# Patient Record
Sex: MALE | Race: BLACK OR AFRICAN AMERICAN | NOT HISPANIC OR LATINO | Employment: UNEMPLOYED | URBAN - METROPOLITAN AREA
[De-identification: names, ages, dates, MRNs, and addresses within clinical notes are randomized per-mention and may not be internally consistent; named-entity substitution may affect disease eponyms.]

---

## 2023-08-18 ENCOUNTER — OFFICE VISIT (OUTPATIENT)
Dept: PRIMARY CARE | Facility: CLINIC | Age: 33
End: 2023-08-18
Payer: MEDICARE

## 2023-08-18 VITALS
BODY MASS INDEX: 29.49 KG/M2 | OXYGEN SATURATION: 96 % | SYSTOLIC BLOOD PRESSURE: 126 MMHG | HEIGHT: 67 IN | TEMPERATURE: 97.3 F | WEIGHT: 187.9 LBS | HEART RATE: 93 BPM | DIASTOLIC BLOOD PRESSURE: 85 MMHG

## 2023-08-18 DIAGNOSIS — I10 BENIGN HYPERTENSION: Primary | ICD-10-CM

## 2023-08-18 PROBLEM — K58.9 IBS (IRRITABLE BOWEL SYNDROME): Status: ACTIVE | Noted: 2023-08-18

## 2023-08-18 PROBLEM — G47.30 OBSERVED SLEEP APNEA: Status: ACTIVE | Noted: 2023-08-18

## 2023-08-18 PROBLEM — K21.9 ESOPHAGEAL REFLUX: Status: ACTIVE | Noted: 2023-08-18

## 2023-08-18 PROBLEM — N20.0 KIDNEY STONE ON LEFT SIDE: Status: ACTIVE | Noted: 2023-08-18

## 2023-08-18 PROBLEM — N20.0 NEPHROLITHIASIS: Status: ACTIVE | Noted: 2023-08-18

## 2023-08-18 PROCEDURE — 99213 OFFICE O/P EST LOW 20 MIN: CPT | Performed by: STUDENT IN AN ORGANIZED HEALTH CARE EDUCATION/TRAINING PROGRAM

## 2023-08-18 PROCEDURE — 1036F TOBACCO NON-USER: CPT | Performed by: STUDENT IN AN ORGANIZED HEALTH CARE EDUCATION/TRAINING PROGRAM

## 2023-08-18 PROCEDURE — 3079F DIAST BP 80-89 MM HG: CPT | Performed by: STUDENT IN AN ORGANIZED HEALTH CARE EDUCATION/TRAINING PROGRAM

## 2023-08-18 PROCEDURE — 3074F SYST BP LT 130 MM HG: CPT | Performed by: STUDENT IN AN ORGANIZED HEALTH CARE EDUCATION/TRAINING PROGRAM

## 2023-08-18 RX ORDER — AMLODIPINE BESYLATE 5 MG/1
1 TABLET ORAL DAILY
COMMUNITY
Start: 2022-10-21 | End: 2024-05-20 | Stop reason: SDUPTHER

## 2023-08-18 RX ORDER — CHLORTHALIDONE 25 MG/1
1 TABLET ORAL DAILY
COMMUNITY
Start: 2022-10-21

## 2023-08-18 ASSESSMENT — PAIN SCALES - GENERAL: PAINLEVEL: 0-NO PAIN

## 2023-08-18 ASSESSMENT — PROMIS GLOBAL HEALTH SCALE
RATE_AVERAGE_FATIGUE: MILD
EMOTIONAL_PROBLEMS: ALWAYS
RATE_SOCIAL_SATISFACTION: GOOD
RATE_PHYSICAL_HEALTH: VERY GOOD
CARRYOUT_PHYSICAL_ACTIVITIES: COMPLETELY
CARRYOUT_SOCIAL_ACTIVITIES: GOOD
RATE_MENTAL_HEALTH: POOR
RATE_AVERAGE_PAIN: 0
RATE_GENERAL_HEALTH: GOOD
RATE_QUALITY_OF_LIFE: VERY GOOD

## 2023-08-18 NOTE — PROGRESS NOTES
"  Medical record number: [id number]    Subjective   Patient ID: Golden Lr is a 33 y.o. male who presents for Establish Care (WELLNESS CHECK ).    1. HTN  Amlodipine 5 mg  Chlorthalidone 25 mg  Good adherence  Does not use tobaco, etoh, drugs  Daily marijuana use  /85 today       Social History:  - Lives with fiance, 4 kids: 17, 16, 6, 3  - Feels safe YES  - Tobacco or vaping: NO  - Alcohol use: NO  - Marijuana use: daily  - Illicit drug use: NO    Family History:  Mother, both grandmothers HTN  Maternal grandmother DM2  No PRCA, CRCA  Lots of family members with kidney stones    Past Medical History:  IBS  Kidney stones, since age 15    Past Surgical History:  NONE    Review of Systems   All other systems reviewed and are negative.      Objective   /85 (BP Location: Left arm, Patient Position: Sitting, BP Cuff Size: Adult long)   Pulse 93   Temp 36.3 °C (97.3 °F) (Temporal)   Ht 1.702 m (5' 7\")   Wt 85.2 kg (187 lb 14.4 oz)   SpO2 96%   BMI 29.43 kg/m²     Physical Exam  Constitutional:       General: He is not in acute distress.  HENT:      Head: Normocephalic and atraumatic.      Nose: Nose normal.      Mouth/Throat:      Mouth: Mucous membranes are moist.      Pharynx: Oropharynx is clear.   Eyes:      Extraocular Movements: Extraocular movements intact.      Conjunctiva/sclera: Conjunctivae normal.      Pupils: Pupils are equal, round, and reactive to light.   Cardiovascular:      Rate and Rhythm: Normal rate.      Pulses: Normal pulses.      Heart sounds: Normal heart sounds. No murmur heard.     No friction rub. No gallop.   Pulmonary:      Effort: Pulmonary effort is normal.      Breath sounds: Normal breath sounds.   Abdominal:      General: Abdomen is flat. Bowel sounds are normal.      Palpations: Abdomen is soft.   Musculoskeletal:      Cervical back: Normal range of motion and neck supple.   Skin:     General: Skin is warm and dry.      Capillary Refill: Capillary refill takes less " than 2 seconds.   Neurological:      General: No focal deficit present.      Mental Status: He is alert.   Psychiatric:         Mood and Affect: Mood normal.         Behavior: Behavior normal.         Assessment/Plan   Problem List Items Addressed This Visit       Benign hypertension - Primary              -------------------------------------------------------------------------------    **This note was entered into the electronic medical record using Dragon medical dictation software, and was not edited thereafter. Please excuse any errors of spelling or grammar.**    -------------------------------------------------------------------------------    OVERALL PLAN:  [ ] Follow up Q6-12 months

## 2024-01-12 ENCOUNTER — APPOINTMENT (OUTPATIENT)
Dept: PRIMARY CARE | Facility: CLINIC | Age: 34
End: 2024-01-12
Payer: MEDICARE

## 2024-04-26 ENCOUNTER — APPOINTMENT (OUTPATIENT)
Dept: PRIMARY CARE | Facility: CLINIC | Age: 34
End: 2024-04-26
Payer: MEDICARE

## 2024-05-20 DIAGNOSIS — I10 BENIGN HYPERTENSION: Primary | ICD-10-CM

## 2024-05-20 RX ORDER — AMLODIPINE BESYLATE 5 MG/1
5 TABLET ORAL DAILY
Qty: 90 TABLET | Refills: 3 | Status: SHIPPED | OUTPATIENT
Start: 2024-05-20 | End: 2024-06-04 | Stop reason: ALTCHOICE

## 2024-05-20 NOTE — TELEPHONE ENCOUNTER
Patient states he is out of pended med. He has an appt on 06/18/24. Can he get a refill for pended med to last until his appt?

## 2024-06-04 ENCOUNTER — OFFICE VISIT (OUTPATIENT)
Dept: PRIMARY CARE | Facility: CLINIC | Age: 34
End: 2024-06-04
Payer: COMMERCIAL

## 2024-06-04 VITALS
TEMPERATURE: 97.5 F | DIASTOLIC BLOOD PRESSURE: 92 MMHG | WEIGHT: 196.1 LBS | BODY MASS INDEX: 30.78 KG/M2 | OXYGEN SATURATION: 97 % | RESPIRATION RATE: 18 BRPM | HEART RATE: 89 BPM | HEIGHT: 67 IN | SYSTOLIC BLOOD PRESSURE: 131 MMHG

## 2024-06-04 DIAGNOSIS — G47.30 OBSERVED SLEEP APNEA: Primary | ICD-10-CM

## 2024-06-04 DIAGNOSIS — I10 BENIGN HYPERTENSION: ICD-10-CM

## 2024-06-04 PROCEDURE — 99213 OFFICE O/P EST LOW 20 MIN: CPT | Performed by: STUDENT IN AN ORGANIZED HEALTH CARE EDUCATION/TRAINING PROGRAM

## 2024-06-04 PROCEDURE — 1036F TOBACCO NON-USER: CPT | Performed by: STUDENT IN AN ORGANIZED HEALTH CARE EDUCATION/TRAINING PROGRAM

## 2024-06-04 PROCEDURE — 3080F DIAST BP >= 90 MM HG: CPT | Performed by: STUDENT IN AN ORGANIZED HEALTH CARE EDUCATION/TRAINING PROGRAM

## 2024-06-04 PROCEDURE — 3075F SYST BP GE 130 - 139MM HG: CPT | Performed by: STUDENT IN AN ORGANIZED HEALTH CARE EDUCATION/TRAINING PROGRAM

## 2024-06-04 RX ORDER — AMLODIPINE BESYLATE 10 MG/1
10 TABLET ORAL DAILY
Qty: 30 TABLET | Refills: 5 | Status: SHIPPED | OUTPATIENT
Start: 2024-06-04 | End: 2024-12-01

## 2024-06-04 ASSESSMENT — PATIENT HEALTH QUESTIONNAIRE - PHQ9
1. LITTLE INTEREST OR PLEASURE IN DOING THINGS: NOT AT ALL
SUM OF ALL RESPONSES TO PHQ9 QUESTIONS 1 AND 2: 0
2. FEELING DOWN, DEPRESSED OR HOPELESS: NOT AT ALL

## 2024-06-04 ASSESSMENT — COLUMBIA-SUICIDE SEVERITY RATING SCALE - C-SSRS
1. IN THE PAST MONTH, HAVE YOU WISHED YOU WERE DEAD OR WISHED YOU COULD GO TO SLEEP AND NOT WAKE UP?: NO
2. HAVE YOU ACTUALLY HAD ANY THOUGHTS OF KILLING YOURSELF?: NO
6. HAVE YOU EVER DONE ANYTHING, STARTED TO DO ANYTHING, OR PREPARED TO DO ANYTHING TO END YOUR LIFE?: NO

## 2024-06-04 ASSESSMENT — ENCOUNTER SYMPTOMS
DEPRESSION: 0
OCCASIONAL FEELINGS OF UNSTEADINESS: 0
LOSS OF SENSATION IN FEET: 0

## 2024-06-04 ASSESSMENT — PAIN SCALES - GENERAL: PAINLEVEL: 0-NO PAIN

## 2024-06-04 NOTE — PROGRESS NOTES
"  Medical record number: [id number]    Subjective   Patient ID: Golden Lr is a 34 y.o. male who presents for Follow-up (Blood pressure).    1. Snoring  Snoring loudly  Gasping at night reported by mother  Fatigue upon waking up most days  Male gender  HTN treatment currently  0.5 cm, >40 cm  BMI <35, BMI 30.71  A/  5/8 STOPBANG  P/  [ ] split night sleep study    2. HTN  Home readings 130s sBP  But dBP still >90  Amlo 5 and chlorthalidone 25   A/  131/92  At home BP reported to be the same range  P/  [ ] Continue 25 mg chlorthalidone  [ ] Increase amlodipine from 5 to 10 mg         Review of Systems   All other systems reviewed and are negative.      Objective   BP (!) 131/92 (BP Location: Left arm, Patient Position: Sitting, BP Cuff Size: Adult)   Pulse 89   Temp 36.4 °C (97.5 °F) (Temporal)   Resp 18   Ht 1.702 m (5' 7\")   Wt 89 kg (196 lb 1.6 oz)   SpO2 97%   BMI 30.71 kg/m²     Physical Exam  Vitals reviewed.   Constitutional:       General: He is not in acute distress.     Appearance: He is obese.   HENT:      Head: Normocephalic and atraumatic.      Nose: Nose normal.      Mouth/Throat:      Mouth: Mucous membranes are moist.      Pharynx: Oropharynx is clear.   Eyes:      Extraocular Movements: Extraocular movements intact.      Conjunctiva/sclera: Conjunctivae normal.      Pupils: Pupils are equal, round, and reactive to light.   Cardiovascular:      Rate and Rhythm: Normal rate.      Pulses: Normal pulses.      Heart sounds: Normal heart sounds. No murmur heard.     No friction rub. No gallop.   Pulmonary:      Effort: Pulmonary effort is normal.      Breath sounds: Normal breath sounds.   Abdominal:      General: Abdomen is flat. Bowel sounds are normal.      Palpations: Abdomen is soft.   Musculoskeletal:      Cervical back: Normal range of motion and neck supple.   Skin:     General: Skin is warm and dry.      Capillary Refill: Capillary refill takes less than 2 seconds.   Neurological:      " General: No focal deficit present.      Mental Status: He is alert.   Psychiatric:         Mood and Affect: Mood normal.         Behavior: Behavior normal.         Assessment/Plan   Problem List Items Addressed This Visit             ICD-10-CM    Benign hypertension I10    Relevant Medications    amLODIPine (Norvasc) 10 mg tablet    Observed sleep apnea - Primary G47.30    Relevant Orders    In-Center Sleep Study (Non-Sleep Provider Only)            OVERALL PLAN:  [ ] Continue 25 mg chlorthalidone  [ ] Increase amlodipine from 5 to 10 mg  [ ] split night sleep study    -------------------------------------------------------------------------------    **This note was entered into the electronic medical record using Dragon medical dictation software, and was not edited thereafter. Please excuse any errors of spelling or grammar.**    -------------------------------------------------------------------------------

## 2024-06-18 ENCOUNTER — APPOINTMENT (OUTPATIENT)
Dept: PRIMARY CARE | Facility: CLINIC | Age: 34
End: 2024-06-18
Payer: MEDICARE

## 2024-08-07 ENCOUNTER — APPOINTMENT (OUTPATIENT)
Dept: PRIMARY CARE | Facility: CLINIC | Age: 34
End: 2024-08-07
Payer: MEDICARE

## 2024-08-28 DIAGNOSIS — I10 BENIGN HYPERTENSION: Primary | ICD-10-CM

## 2024-08-28 RX ORDER — CHLORTHALIDONE 25 MG/1
25 TABLET ORAL DAILY
Qty: 30 TABLET | Refills: 11 | Status: SHIPPED | OUTPATIENT
Start: 2024-08-28

## 2024-10-16 ENCOUNTER — APPOINTMENT (OUTPATIENT)
Dept: PRIMARY CARE | Facility: CLINIC | Age: 34
End: 2024-10-16
Payer: COMMERCIAL

## 2024-12-11 ENCOUNTER — APPOINTMENT (OUTPATIENT)
Dept: PRIMARY CARE | Facility: CLINIC | Age: 34
End: 2024-12-11
Payer: COMMERCIAL

## 2025-01-28 ENCOUNTER — APPOINTMENT (OUTPATIENT)
Dept: PRIMARY CARE | Facility: CLINIC | Age: 35
End: 2025-01-28
Payer: COMMERCIAL

## 2025-02-05 ENCOUNTER — APPOINTMENT (OUTPATIENT)
Dept: PRIMARY CARE | Facility: CLINIC | Age: 35
End: 2025-02-05
Payer: COMMERCIAL

## 2025-04-01 ENCOUNTER — APPOINTMENT (OUTPATIENT)
Facility: HOSPITAL | Age: 35
End: 2025-04-01
Payer: COMMERCIAL

## 2025-04-02 ENCOUNTER — APPOINTMENT (OUTPATIENT)
Dept: SLEEP MEDICINE | Facility: CLINIC | Age: 35
End: 2025-04-02
Payer: COMMERCIAL

## 2025-04-22 ENCOUNTER — APPOINTMENT (OUTPATIENT)
Facility: HOSPITAL | Age: 35
End: 2025-04-22
Payer: COMMERCIAL

## 2025-04-28 ENCOUNTER — APPOINTMENT (OUTPATIENT)
Facility: HOSPITAL | Age: 35
End: 2025-04-28
Payer: COMMERCIAL

## 2025-04-28 VITALS
DIASTOLIC BLOOD PRESSURE: 91 MMHG | HEART RATE: 103 BPM | OXYGEN SATURATION: 95 % | TEMPERATURE: 97.9 F | SYSTOLIC BLOOD PRESSURE: 134 MMHG | WEIGHT: 212.2 LBS | HEIGHT: 67 IN | BODY MASS INDEX: 33.3 KG/M2

## 2025-04-28 DIAGNOSIS — Z01.84 IMMUNITY STATUS TESTING: ICD-10-CM

## 2025-04-28 DIAGNOSIS — Z11.3 SCREENING EXAMINATION FOR STD (SEXUALLY TRANSMITTED DISEASE): ICD-10-CM

## 2025-04-28 DIAGNOSIS — I10 BENIGN HYPERTENSION: Primary | ICD-10-CM

## 2025-04-28 DIAGNOSIS — K21.9 GASTROESOPHAGEAL REFLUX DISEASE, UNSPECIFIED WHETHER ESOPHAGITIS PRESENT: ICD-10-CM

## 2025-04-28 DIAGNOSIS — Z00.00 ROUTINE ADULT HEALTH MAINTENANCE: ICD-10-CM

## 2025-04-28 PROCEDURE — 3008F BODY MASS INDEX DOCD: CPT

## 2025-04-28 PROCEDURE — 1036F TOBACCO NON-USER: CPT

## 2025-04-28 PROCEDURE — 3075F SYST BP GE 130 - 139MM HG: CPT

## 2025-04-28 PROCEDURE — 3080F DIAST BP >= 90 MM HG: CPT

## 2025-04-28 PROCEDURE — 90715 TDAP VACCINE 7 YRS/> IM: CPT | Mod: GC

## 2025-04-28 PROCEDURE — 99395 PREV VISIT EST AGE 18-39: CPT

## 2025-04-28 PROCEDURE — 99395 PREV VISIT EST AGE 18-39: CPT | Mod: 25

## 2025-04-28 RX ORDER — OMEPRAZOLE 20 MG/1
20 CAPSULE, DELAYED RELEASE ORAL
Qty: 30 CAPSULE | Refills: 0 | Status: SHIPPED | OUTPATIENT
Start: 2025-04-28 | End: 2025-05-28

## 2025-04-28 RX ORDER — CHLORTHALIDONE 25 MG/1
25 TABLET ORAL DAILY
Qty: 90 TABLET | Refills: 3 | Status: SHIPPED | OUTPATIENT
Start: 2025-04-28 | End: 2026-04-28

## 2025-04-28 RX ORDER — AMLODIPINE BESYLATE 10 MG/1
10 TABLET ORAL DAILY
Qty: 90 TABLET | Refills: 3 | Status: SHIPPED | OUTPATIENT
Start: 2025-04-28 | End: 2026-04-28

## 2025-04-28 ASSESSMENT — PROMIS GLOBAL HEALTH SCALE
EMOTIONAL_PROBLEMS: RARELY
RATE_GENERAL_HEALTH: VERY GOOD
RATE_MENTAL_HEALTH: GOOD
RATE_AVERAGE_FATIGUE: MODERATE
RATE_AVERAGE_PAIN: 5
RATE_SOCIAL_SATISFACTION: VERY GOOD
RATE_QUALITY_OF_LIFE: EXCELLENT
CARRYOUT_PHYSICAL_ACTIVITIES: COMPLETELY
CARRYOUT_SOCIAL_ACTIVITIES: VERY GOOD
RATE_PHYSICAL_HEALTH: GOOD

## 2025-04-28 ASSESSMENT — PAIN SCALES - GENERAL: PAINLEVEL_OUTOF10: 0-NO PAIN

## 2025-04-28 NOTE — PROGRESS NOTES
"04/28/25      Golden Lr is a 35 y.o. year old male patient with HTN  is here for an annual physical.    Concerns:     #acid reflux   - for few months   - worse at night   - with heart burn  - tums didn't help  - dysphagia  - no epigastric pain  - no nausea/vomiting  - regular BM daily, no blood in stool, no melena  - good appetite and energy     #HTN  - BP at home wnl   - requesting rx refill  - taking amlodipine 10mg daily and chlorthalidone 25mg daily   - denies sx     Diet: eats salty food  Exercise: no exercise but job involves physical work  Tobacco: ex-smoker, stopped 6 years ago   Alcohol: no  Drugs: marijauna daily   Sex: sexually active with 1 female partner, does not use condom, no STI concern, wants STD screening   Depression: PHQ2 negative       Immunization due: Tdap     Immunization History   Administered Date(s) Administered    PPD Test 08/30/2023    Tdap vaccine, age 7 year and older (BOOSTRIX, ADACEL) 03/30/2013, 04/28/2025         Colon Cancer  - FHx colon cancer?: no  - last colonoscopy: n/a    Lung Cancer: n/a    AAA: n/a    Prostate: n/a  No results found for: \"PSA\"    Lipid: due  A1c: due  HIV (15-65): due  HCV (18-79): due    Objective   BP (!) 134/91 (BP Location: Right arm, Patient Position: Sitting, BP Cuff Size: Adult)   Pulse 103   Temp 36.6 °C (97.9 °F) (Temporal)   Ht 1.702 m (5' 7\")   Wt 96.3 kg (212 lb 3.2 oz)   SpO2 95%   BMI 33.24 kg/m²     Physical Exam    General: well-appearing, NAD  HEENT: NC/AT; nose normal appearing; pinna normal appearing, canals clear, TM visible bilaterally without effusion or bulging  Neck: supple, no CLAD  Cardiac: rrr, no m/r/g  Lungs: normal work of breathing, CTAB  Abdomen: soft, non-distended, non-tender to palpation; BS present throughout  Extremities: warm, no LE edema, 2+ radial pulses bilaterally  Neuro: grossly intact  Psych: no depression, anxiety      Assessment/Plan     36 yo M with HTN here for physical exam. Acute concern included " acid reflux with no red flag sx.     Problem List Items Addressed This Visit       Benign hypertension - Primary  - BP borderline  - Patient remains asymptomatic   - continue with current regimen   - encouraged DASH diet and exercise  - recommend regular eye exam  - ordered labs as below       Relevant Medications    amLODIPine (Norvasc) 10 mg tablet    chlorthalidone (Hygroton) 25 mg tablet    Other Relevant Orders    Comprehensive metabolic panel    Albumin-Creatinine Ratio, Urine Random    Follow Up In Primary Care    Esophageal reflux  - trial of PPI   - return if sx not better       Relevant Medications    omeprazole (PriLOSEC) 20 mg DR capsule     Other Visit Diagnoses         Routine adult health maintenance          Relevant Orders    HIV 1/2 Antigen/Antibody Screen with Reflex to Confirmation    Hepatitis C antibody    Hemoglobin A1c    Lipid panel    Tdap vaccine, age 7 years and older  (BOOSTRIX) (Completed)      BMI 33.0-33.9,adult      - discussed healthy lifestyle intervention       Relevant Orders    Referral to Nutrition Services      Screening examination for STD (sexually transmitted disease)      - discussed safe sex     Relevant Orders    HIV 1/2 Antigen/Antibody Screen with Reflex to Confirmation    C. trachomatis / N. gonorrhoeae, Amplified, Urogenital    Syphilis Screen with Reflex      Immunity status testing        Relevant Orders    Measles (Rubeola) Antibody, IgG    Mumps Antibody, IgG    Rubella antibody, IgG          RTC in 3 months HTN follow up     Discussed with Dr. Carlos Cole MD

## 2025-04-29 DIAGNOSIS — Z11.3 SCREENING EXAMINATION FOR STD (SEXUALLY TRANSMITTED DISEASE): Primary | ICD-10-CM

## 2025-05-01 LAB
ALBUMIN SERPL-MCNC: 4.2 G/DL (ref 3.6–5.1)
ALBUMIN/CREAT UR: 1 MG/G CREAT
ALP SERPL-CCNC: 72 U/L (ref 36–130)
ALT SERPL-CCNC: 30 U/L (ref 9–46)
ANION GAP SERPL CALCULATED.4IONS-SCNC: 9 MMOL/L (CALC) (ref 7–17)
AST SERPL-CCNC: 20 U/L (ref 10–40)
BILIRUB SERPL-MCNC: 0.3 MG/DL (ref 0.2–1.2)
BUN SERPL-MCNC: 21 MG/DL (ref 7–25)
C TRACH RRNA SPEC QL NAA+PROBE: NORMAL
CALCIUM SERPL-MCNC: 9.4 MG/DL (ref 8.6–10.3)
CHLORIDE SERPL-SCNC: 102 MMOL/L (ref 98–110)
CHOLEST SERPL-MCNC: 200 MG/DL
CHOLEST/HDLC SERPL: 4.4 (CALC)
CO2 SERPL-SCNC: 30 MMOL/L (ref 20–32)
CREAT SERPL-MCNC: 1.14 MG/DL (ref 0.6–1.26)
CREAT UR-MCNC: 386 MG/DL (ref 20–320)
EGFRCR SERPLBLD CKD-EPI 2021: 86 ML/MIN/1.73M2
EST. AVERAGE GLUCOSE BLD GHB EST-MCNC: 128 MG/DL
EST. AVERAGE GLUCOSE BLD GHB EST-SCNC: 7.1 MMOL/L
GLUCOSE SERPL-MCNC: 85 MG/DL (ref 65–139)
HBA1C MFR BLD: 6.1 %
HCV AB SERPL QL IA: NORMAL
HDLC SERPL-MCNC: 45 MG/DL
HIV 1+2 AB+HIV1 P24 AG SERPL QL IA: NORMAL
LDLC SERPL CALC-MCNC: 129 MG/DL (CALC)
MEV IGG SER IA-ACNC: >300 AU/ML
MICROALBUMIN UR-MCNC: 0.5 MG/DL
MUV IGG SER IA-ACNC: 12.8 AU/ML
NONHDLC SERPL-MCNC: 155 MG/DL (CALC)
POTASSIUM SERPL-SCNC: 3.5 MMOL/L (ref 3.5–5.3)
PROT SERPL-MCNC: 6.5 G/DL (ref 6.1–8.1)
RUBV IGG SERPL IA-ACNC: 5.78 INDEX
SODIUM SERPL-SCNC: 141 MMOL/L (ref 135–146)
T PALLIDUM AB SER QL IA: NEGATIVE
TRIGL SERPL-MCNC: 143 MG/DL

## 2025-05-20 ENCOUNTER — TELEPHONE (OUTPATIENT)
Facility: HOSPITAL | Age: 35
End: 2025-05-20
Payer: COMMERCIAL

## 2025-05-20 NOTE — TELEPHONE ENCOUNTER
Result Communication    Resulted Orders   HIV 1/2 Antigen/Antibody Screen with Reflex to Confirmation   Result Value Ref Range    HIV AG/AB, 4TH GEN NON-REACTIVE NON-REACTIVE      Comment:      HIV-1 antigen and HIV-1/HIV-2 antibodies were not  detected. There is no laboratory evidence of HIV  infection.     PLEASE NOTE: This information has been disclosed to  you from records whose confidentiality may be  protected by state law.  If your state requires such  protection, then the state law prohibits you from  making any further disclosure of the information  without the specific written consent of the person  to whom it pertains, or as otherwise permitted by law.  A general authorization for the release of medical or  other information is NOT sufficient for this purpose.      For additional information please refer to  http://[employer].InstaEDU/faq/NNT608  (This link is being provided for informational/  educational purposes only.)        The performance of this assay has not been clinically  validated in patients less than 2 years old.         Narrative    FASTING:NO    FASTING: NO   Hepatitis C antibody   Result Value Ref Range    HEPATITIS C ANTIBODY NON-REACTIVE NON-REACTIVE      Comment:         HCV antibody was non-reactive. There is no laboratory   evidence of HCV infection.     In most cases, no further action is required. However,  if recent HCV exposure is suspected, a test for HCV RNA  (test code 23841) is suggested.     For additional information please refer to  http://[email or web address].InstaEDU/faq/HFI91g1  (This link is being provided for informational/  educational purposes only.)         Narrative    FASTING:NO    FASTING: NO   Hemoglobin A1c   Result Value Ref Range    HEMOGLOBIN A1c 6.1 (H) <5.7 %      Comment:      For someone without known diabetes, a hemoglobin   A1c value between 5.7% and 6.4% is consistent with  prediabetes and should be confirmed with a   follow-up test.      For someone with known diabetes, a value <7%  indicates that their diabetes is well controlled. A1c  targets should be individualized based on duration of  diabetes, age, comorbid conditions, and other  considerations.     This assay result is consistent with an increased risk  of diabetes.     Currently, no consensus exists regarding use of  hemoglobin A1c for diagnosis of diabetes for children.         eAG (mg/dL) 128 mg/dL    eAG (mmol/L) 7.1 mmol/L    Narrative    FASTING:NO    FASTING: NO   Lipid panel   Result Value Ref Range    CHOLESTEROL, TOTAL 200 (H) <200 mg/dL    HDL CHOLESTEROL 45 > OR = 40 mg/dL    TRIGLYCERIDES 143 <150 mg/dL    LDL-CHOLESTEROL 129 (H) mg/dL (calc)      Comment:      Reference range: <100     Desirable range <100 mg/dL for primary prevention;    <70 mg/dL for patients with CHD or diabetic patients   with > or = 2 CHD risk factors.     LDL-C is now calculated using the Flaco   calculation, which is a validated novel method providing   better accuracy than the Friedewald equation in the   estimation of LDL-C.   Preston SS et al. TANVIR. 2013;310(19): 0717-3258   (http://education.[employer].LaunchHear/faq/GVV179)      CHOL/HDLC RATIO 4.4 <5.0 (calc)    NON HDL CHOLESTEROL 155 (H) <130 mg/dL (calc)      Comment:      For patients with diabetes plus 1 major ASCVD risk   factor, treating to a non-HDL-C goal of <100 mg/dL   (LDL-C of <70 mg/dL) is considered a therapeutic   option.      Narrative    FASTING:NO    FASTING: NO   Comprehensive metabolic panel   Result Value Ref Range    GLUCOSE 85 65 - 139 mg/dL      Comment:                Non-fasting reference interval         UREA NITROGEN (BUN) 21 7 - 25 mg/dL    CREATININE 1.14 0.60 - 1.26 mg/dL    EGFR 86 > OR = 60 mL/min/1.73m2    SODIUM 141 135 - 146 mmol/L    POTASSIUM 3.5 3.5 - 5.3 mmol/L    CHLORIDE 102 98 - 110 mmol/L    CARBON DIOXIDE 30 20 - 32 mmol/L    ELECTROLYTE BALANCE 9 7 - 17 mmol/L (calc)    CALCIUM 9.4 8.6 - 10.3  mg/dL    PROTEIN, TOTAL 6.5 6.1 - 8.1 g/dL    ALBUMIN 4.2 3.6 - 5.1 g/dL    BILIRUBIN, TOTAL 0.3 0.2 - 1.2 mg/dL    ALKALINE PHOSPHATASE 72 36 - 130 U/L    AST 20 10 - 40 U/L    ALT 30 9 - 46 U/L    Narrative    FASTING:NO    FASTING: NO   Albumin-Creatinine Ratio, Urine Random   Result Value Ref Range    CREATININE, RANDOM URINE 386 (H) 20 - 320 mg/dL      Comment:      Verified by repeat analysis.         ALBUMIN, URINE 0.5 See Note: mg/dL      Comment:      Reference Range:    Reference Range  Not established      ALBUMIN/CREATININE RATIO, RANDOM URINE 1 <30 mg/g creat      Comment:         The ADA defines abnormalities in albumin  excretion as follows:     Albuminuria Category        Result (mg/g creatinine)     Normal to Mildly increased   <30  Moderately increased            Severely increased           > OR = 300     The ADA recommends that at least two of three  specimens collected within a 3-6 month period be  abnormal before considering a patient to be  within a diagnostic category.      Narrative    FASTING:NO    FASTING: NO   C. trachomatis / N. gonorrhoeae, Amplified, Urogenital   Result Value Ref Range    CHLAMYDIA TRACHOMATIS RNA, TMA, UROGENITAL TNP       Comment:      TEST NOT PERFORMED     No suitable specimen received.  Please review the test  requirements at   [email or web address]  UA NOT ACCEPTABLE      Narrative    FASTING:NO    FASTING: NO   Syphilis Screen with Reflex   Result Value Ref Range    T. PALLIDUM AB Negative Negative      Comment:         No antibodies to T. pallidum (the agent causing  syphilis) were detected in the specimen. This result,  however, does not exclude very recent T. pallidum  infection; testing of a second specimen, collected 2-4  weeks after this specimen, is recommended if the index  of suspicion for recent infection is high.         Narrative    FASTING:NO    FASTING: NO   Measles (Rubeola) Antibody, IgG   Result Value Ref Range    MEASLES AB  (IGG), IMMUNE STATUS >300.00 AU/mL      Comment:      AU/mL            Interpretation  -----            --------------  <13.50           Not consistent with immunity  13.50-16.49      Equivocal  >16.49           Consistent with immunity     The presence of measles IgG suggests immunization or  past or current infection with measles virus.     For additional information, please refer to  http://NI.[employer]/faq/MCM937  (This link is being provided for informational/  educational purposes only.)         Narrative    FASTING:NO    FASTING: NO   Mumps Antibody, IgG   Result Value Ref Range    MUMPS VIRUS AB (IGG), IMMUNE STATUS 12.80 AU/mL      Comment:       AU/mL           Interpretation  -------         ----------------  <9.00             Not consistent with immunity  9.00-10.99        Equivocal  >10.99            Consistent with immunity     The presence of mumps IgG antibody suggests immunization  or past or current infection with mumps virus.         Narrative    FASTING:NO    FASTING: NO   Rubella antibody, IgG   Result Value Ref Range    RUBELLA AB (IGG), IMMUNE STATUS 5.78 Index      Comment:          Index            Interpretation      -----            --------------        <0.90            Not consistent with immunity      0.90-0.99        Equivocal      > or = 1.00      Consistent with immunity      The presence of rubella IgG antibody suggests   immunization or past or current infection with  rubella virus.      Narrative    FASTING:NO    FASTING: NO       3:05 PM    #prediabetes (increased risk of getting diabetes in the future) and slightly elevated cholesterol. We recommend lifestyle intervention including healthy diet and exercise as 1st line treatment. Will continue to monitor and if it does not improve, we can discuss medication in the future. Rest of your labs looks normal. Your STD screening was not performed due to incorrect sampling, I have re-ordered it, could you please stop  by the lab to give your urine sample. Your syphilis test result is negative.       Called patient, not answered, left voicemail. Also sent message on ViaView.     Helder Cole MD  Family Medicine PGY3

## 2025-07-23 ENCOUNTER — APPOINTMENT (OUTPATIENT)
Facility: HOSPITAL | Age: 35
End: 2025-07-23
Payer: COMMERCIAL

## 2025-08-13 ENCOUNTER — APPOINTMENT (OUTPATIENT)
Facility: HOSPITAL | Age: 35
End: 2025-08-13
Payer: COMMERCIAL

## 2025-09-10 ENCOUNTER — APPOINTMENT (OUTPATIENT)
Facility: HOSPITAL | Age: 35
End: 2025-09-10
Payer: COMMERCIAL